# Patient Record
Sex: MALE | Race: WHITE | NOT HISPANIC OR LATINO | Employment: OTHER | URBAN - METROPOLITAN AREA
[De-identification: names, ages, dates, MRNs, and addresses within clinical notes are randomized per-mention and may not be internally consistent; named-entity substitution may affect disease eponyms.]

---

## 2021-09-15 ENCOUNTER — E-VISIT (OUTPATIENT)
Dept: FAMILY MEDICINE CLINIC | Facility: TELEHEALTH | Age: 61
End: 2021-09-15

## 2021-09-15 ENCOUNTER — TELEMEDICINE (OUTPATIENT)
Dept: FAMILY MEDICINE CLINIC | Facility: TELEHEALTH | Age: 61
End: 2021-09-15

## 2021-09-15 DIAGNOSIS — I10 ESSENTIAL HYPERTENSION: ICD-10-CM

## 2021-09-15 DIAGNOSIS — U07.1 COVID-19 VIRUS INFECTION: Primary | ICD-10-CM

## 2021-09-15 PROBLEM — E66.9 OBESITY: Status: ACTIVE | Noted: 2021-09-15

## 2021-09-15 PROCEDURE — 99213 OFFICE O/P EST LOW 20 MIN: CPT | Performed by: NURSE PRACTITIONER

## 2021-09-15 RX ORDER — DIPHENHYDRAMINE HYDROCHLORIDE 50 MG/ML
50 INJECTION INTRAMUSCULAR; INTRAVENOUS AS NEEDED
Status: CANCELLED | OUTPATIENT
Start: 2021-09-15

## 2021-09-15 RX ORDER — EPINEPHRINE 1 MG/ML
0.3 INJECTION, SOLUTION, CONCENTRATE INTRAVENOUS AS NEEDED
Status: CANCELLED | OUTPATIENT
Start: 2021-09-15

## 2021-09-15 RX ORDER — METHYLPREDNISOLONE SODIUM SUCCINATE 125 MG/2ML
125 INJECTION, POWDER, LYOPHILIZED, FOR SOLUTION INTRAMUSCULAR; INTRAVENOUS AS NEEDED
Status: CANCELLED | OUTPATIENT
Start: 2021-09-15

## 2021-09-15 RX ORDER — SODIUM CHLORIDE 9 MG/ML
30 INJECTION, SOLUTION INTRAVENOUS ONCE
Status: CANCELLED | OUTPATIENT
Start: 2021-09-15

## 2021-09-15 NOTE — PROGRESS NOTES
CHIEF COMPLAINT  Chief Complaint   Patient presents with   • covid positive         HPI  Waqar Franco is a 61 y.o. male  presents with complaint of tested positive for COVID on 9/12/21 with symptoms beginning on 9/11/21.  Has undiagnosed hypertension with BP of 160-170/80-90, age is >55.     Symptoms include severe headaches, fever, body aches, dry cough, general unwell feeling    Review of Systems   Constitutional: Positive for activity change, appetite change, chills, fatigue and fever.   Respiratory: Positive for cough.    Musculoskeletal: Positive for arthralgias and myalgias.   Neurological: Positive for headaches.   All other systems reviewed and are negative.      No past medical history on file.    No family history on file.    Social History     Socioeconomic History   • Marital status:      Spouse name: Not on file   • Number of children: Not on file   • Years of education: Not on file   • Highest education level: Not on file         There were no vitals taken for this visit.    PHYSICAL EXAM  Physical Exam   Constitutional: He appears well-developed and well-nourished.   HENT:   Head: Normocephalic and atraumatic.   Pulmonary/Chest: Effort normal.  No respiratory distress.      No results found for this or any previous visit.    Diagnoses and all orders for this visit:    1. COVID-19 virus infection (Primary)  -     Ambulatory Referral For Covid-19 Infusion Treatment    2. Essential hypertension  -     Ambulatory Referral For Covid-19 Infusion Treatment    Other orders  -     INV casirivimab / imdevimab 1200 mg in NS 60mL IVPB  -     sodium chloride 0.9 % infusion 30 mL  -     EPINEPHrine PF (ADRENALIN) injection 0.3 mg  -     diphenhydrAMINE (BENADRYL) injection 50 mg  -     methylPREDNISolone sodium succinate (SOLU-Medrol) injection 125 mg    --referral for infusion of Monoclonal antibodies  --pt schedule for tomorrow       FOLLOW-UP  As discussed during visit with PCP/[facility] if no  improvement or Urgent Care/Emergency Department if worsening of symptoms    Patient verbalizes understanding of medication dosage, comfort measures, instructions for treatment and follow-up.    SAUL Tamez  09/15/2021  12:44 EDT    This visit was performed via Telehealth.  This patient has been instructed to follow-up with their primary care provider if their symptoms worsen or the treatment provided does not resolve their illness.

## 2021-09-16 ENCOUNTER — HOSPITAL ENCOUNTER (OUTPATIENT)
Dept: INFUSION THERAPY | Facility: HOSPITAL | Age: 61
Discharge: HOME | End: 2021-09-16
Admitting: NURSE PRACTITIONER

## 2021-09-16 VITALS
WEIGHT: 250 LBS | SYSTOLIC BLOOD PRESSURE: 163 MMHG | HEART RATE: 96 BPM | TEMPERATURE: 98.2 F | RESPIRATION RATE: 20 BRPM | OXYGEN SATURATION: 95 % | HEIGHT: 72 IN | BODY MASS INDEX: 33.86 KG/M2 | DIASTOLIC BLOOD PRESSURE: 105 MMHG

## 2021-09-16 DIAGNOSIS — U07.1 COVID-19 VIRUS INFECTION: Primary | ICD-10-CM

## 2021-09-16 DIAGNOSIS — I10 ESSENTIAL HYPERTENSION: ICD-10-CM

## 2021-09-16 PROCEDURE — 25010000006 INJECTION, CASIRIVIMAB AND IMDEVIMAB, 1200 MG: Performed by: NURSE PRACTITIONER

## 2021-09-16 PROCEDURE — M0243 CASIRIVI AND IMDEVI INFUSION: HCPCS | Performed by: NURSE PRACTITIONER

## 2021-09-16 PROCEDURE — 96365 THER/PROPH/DIAG IV INF INIT: CPT

## 2021-09-16 RX ORDER — DIPHENHYDRAMINE HYDROCHLORIDE 50 MG/ML
50 INJECTION INTRAMUSCULAR; INTRAVENOUS AS NEEDED
Status: DISCONTINUED | OUTPATIENT
Start: 2021-09-16 | End: 2021-09-18 | Stop reason: HOSPADM

## 2021-09-16 RX ORDER — SODIUM CHLORIDE 9 MG/ML
30 INJECTION, SOLUTION INTRAVENOUS ONCE
Status: CANCELLED | OUTPATIENT
Start: 2021-09-16

## 2021-09-16 RX ORDER — SODIUM CHLORIDE 9 MG/ML
30 INJECTION, SOLUTION INTRAVENOUS ONCE
Status: COMPLETED | OUTPATIENT
Start: 2021-09-16 | End: 2021-09-16

## 2021-09-16 RX ORDER — DIPHENHYDRAMINE HYDROCHLORIDE 50 MG/ML
50 INJECTION INTRAMUSCULAR; INTRAVENOUS AS NEEDED
Status: CANCELLED | OUTPATIENT
Start: 2021-09-16

## 2021-09-16 RX ORDER — METHYLPREDNISOLONE SODIUM SUCCINATE 125 MG/2ML
125 INJECTION, POWDER, LYOPHILIZED, FOR SOLUTION INTRAMUSCULAR; INTRAVENOUS AS NEEDED
Status: DISCONTINUED | OUTPATIENT
Start: 2021-09-16 | End: 2021-09-18 | Stop reason: HOSPADM

## 2021-09-16 RX ORDER — METHYLPREDNISOLONE SODIUM SUCCINATE 125 MG/2ML
125 INJECTION, POWDER, LYOPHILIZED, FOR SOLUTION INTRAMUSCULAR; INTRAVENOUS AS NEEDED
Status: CANCELLED | OUTPATIENT
Start: 2021-09-16

## 2021-09-16 RX ADMIN — SODIUM CHLORIDE 30 ML: 9 INJECTION, SOLUTION INTRAVENOUS at 17:44

## 2021-09-16 RX ADMIN — CASIRIVIMAB AND IMDEVIMAB: 600; 600 INJECTION, SOLUTION, CONCENTRATE INTRAVENOUS at 17:43

## 2021-09-16 NOTE — PROGRESS NOTES
Patient here for Regeneron infusion for Covid 19 infection. Fact sheet for EUA of Regen-COV given and explained. Several questions regarding the use of this investigational drug including risks, benefits, and possible side effects discussed. Advised patient not to receive Covid vaccine for 90 days. Pt agreed with plan of care and to proceed with infusion.    PIV started in left hand x1 attempt without complication, Pt tolerated well. NS to flush after meds.    Pt instructed to follow CDC recommendations for quarantine period, good handwashing and wearing a mask. Pt instructed to rest and stay hydrated.    Regeneron infusion started. Call light in reach. Pt instructed to use call light for any needs or concerns.    Infusion and 1 hour monitoring period completed, NAD noted. Pt instructed to monitor for signs of delayed reaction and report to nearest ED for life threatening s/s.    Appropriate PPE worn during the care of the patient, including N95 mask, goggles, gloves, gown and hair cover.

## 2021-09-16 NOTE — PROGRESS NOTES
Pt. Aware of his elevated B/P and stated he experiences whitecoat hypertension.  B/P was elevated through the visit as documented. Pt educated on the risk of chronic hypertension.  Patient stated he would follow up with his PCP.